# Patient Record
Sex: FEMALE | Employment: FULL TIME | URBAN - METROPOLITAN AREA
[De-identification: names, ages, dates, MRNs, and addresses within clinical notes are randomized per-mention and may not be internally consistent; named-entity substitution may affect disease eponyms.]

---

## 2021-01-04 ENCOUNTER — OFFICE VISIT (OUTPATIENT)
Dept: HEMATOLOGY | Age: 36
End: 2021-01-04

## 2021-01-04 ENCOUNTER — HOSPITAL ENCOUNTER (OUTPATIENT)
Dept: LAB | Age: 36
Discharge: HOME | End: 2021-01-04
Payer: OTHER GOVERNMENT

## 2021-01-04 VITALS
OXYGEN SATURATION: 98 % | DIASTOLIC BLOOD PRESSURE: 107 MMHG | WEIGHT: 229 LBS | RESPIRATION RATE: 16 BRPM | BODY MASS INDEX: 36.8 KG/M2 | HEIGHT: 66 IN | SYSTOLIC BLOOD PRESSURE: 150 MMHG | HEART RATE: 97 BPM | TEMPERATURE: 100 F

## 2021-01-04 DIAGNOSIS — R74.8 ELEVATED LIVER ENZYMES: ICD-10-CM

## 2021-01-04 DIAGNOSIS — R74.8 ELEVATED LIVER ENZYMES: Primary | ICD-10-CM

## 2021-01-04 PROBLEM — E06.3 HASHIMOTO'S DISEASE: Status: ACTIVE | Noted: 2021-01-04

## 2021-01-04 LAB
ALBUMIN SERPL-MCNC: 3.5 G/DL (ref 3.4–5)
ALBUMIN/GLOB SERPL: 1 {RATIO} (ref 0.8–1.7)
ALP SERPL-CCNC: 84 U/L (ref 45–117)
ALT SERPL-CCNC: 22 U/L (ref 13–56)
ANION GAP SERPL CALC-SCNC: 6 MMOL/L (ref 3–18)
AST SERPL-CCNC: 11 U/L (ref 10–38)
BASOPHILS # BLD: 0 K/UL (ref 0–0.1)
BASOPHILS NFR BLD: 0 % (ref 0–2)
BILIRUB DIRECT SERPL-MCNC: <0.1 MG/DL (ref 0–0.2)
BILIRUB SERPL-MCNC: 0.2 MG/DL (ref 0.2–1)
BUN SERPL-MCNC: 10 MG/DL (ref 7–18)
BUN/CREAT SERPL: 15 (ref 12–20)
CALCIUM SERPL-MCNC: 9 MG/DL (ref 8.5–10.1)
CHLORIDE SERPL-SCNC: 107 MMOL/L (ref 100–111)
CO2 SERPL-SCNC: 25 MMOL/L (ref 21–32)
CREAT SERPL-MCNC: 0.68 MG/DL (ref 0.6–1.3)
DIFFERENTIAL METHOD BLD: ABNORMAL
EOSINOPHIL # BLD: 0.1 K/UL (ref 0–0.4)
EOSINOPHIL NFR BLD: 1 % (ref 0–5)
ERYTHROCYTE [DISTWIDTH] IN BLOOD BY AUTOMATED COUNT: 13.1 % (ref 11.6–14.5)
FERRITIN SERPL-MCNC: 42 NG/ML (ref 8–388)
GLOBULIN SER CALC-MCNC: 3.4 G/DL (ref 2–4)
GLUCOSE SERPL-MCNC: 121 MG/DL (ref 74–99)
HCT VFR BLD AUTO: 39.9 % (ref 35–45)
HGB BLD-MCNC: 13.2 G/DL (ref 12–16)
INR PPP: 1 (ref 0.8–1.2)
IRON SATN MFR SERPL: 22 % (ref 20–50)
IRON SERPL-MCNC: 91 UG/DL (ref 50–175)
LYMPHOCYTES # BLD: 1.9 K/UL (ref 0.9–3.6)
LYMPHOCYTES NFR BLD: 25 % (ref 21–52)
MCH RBC QN AUTO: 31.8 PG (ref 24–34)
MCHC RBC AUTO-ENTMCNC: 33.1 G/DL (ref 31–37)
MCV RBC AUTO: 96.1 FL (ref 74–97)
MONOCYTES # BLD: 0.3 K/UL (ref 0.05–1.2)
MONOCYTES NFR BLD: 4 % (ref 3–10)
NEUTS SEG # BLD: 5.3 K/UL (ref 1.8–8)
NEUTS SEG NFR BLD: 70 % (ref 40–73)
PLATELET # BLD AUTO: 370 K/UL (ref 135–420)
PMV BLD AUTO: 9.3 FL (ref 9.2–11.8)
POTASSIUM SERPL-SCNC: 4.3 MMOL/L (ref 3.5–5.5)
PROT SERPL-MCNC: 6.9 G/DL (ref 6.4–8.2)
PROTHROMBIN TIME: 13.1 SEC (ref 11.5–15.2)
RBC # BLD AUTO: 4.15 M/UL (ref 4.2–5.3)
SODIUM SERPL-SCNC: 138 MMOL/L (ref 136–145)
TIBC SERPL-MCNC: 421 UG/DL (ref 250–450)
WBC # BLD AUTO: 7.7 K/UL (ref 4.6–13.2)

## 2021-01-04 PROCEDURE — 86256 FLUORESCENT ANTIBODY TITER: CPT

## 2021-01-04 PROCEDURE — 80076 HEPATIC FUNCTION PANEL: CPT

## 2021-01-04 PROCEDURE — 86708 HEPATITIS A ANTIBODY: CPT

## 2021-01-04 PROCEDURE — 83516 IMMUNOASSAY NONANTIBODY: CPT

## 2021-01-04 PROCEDURE — 83540 ASSAY OF IRON: CPT

## 2021-01-04 PROCEDURE — 99204 OFFICE O/P NEW MOD 45 MIN: CPT | Performed by: NURSE PRACTITIONER

## 2021-01-04 PROCEDURE — 82107 ALPHA-FETOPROTEIN L3: CPT

## 2021-01-04 PROCEDURE — 86038 ANTINUCLEAR ANTIBODIES: CPT

## 2021-01-04 PROCEDURE — 82728 ASSAY OF FERRITIN: CPT

## 2021-01-04 PROCEDURE — 85610 PROTHROMBIN TIME: CPT

## 2021-01-04 PROCEDURE — 86706 HEP B SURFACE ANTIBODY: CPT

## 2021-01-04 PROCEDURE — 86803 HEPATITIS C AB TEST: CPT

## 2021-01-04 PROCEDURE — 86704 HEP B CORE ANTIBODY TOTAL: CPT

## 2021-01-04 PROCEDURE — 36415 COLL VENOUS BLD VENIPUNCTURE: CPT

## 2021-01-04 PROCEDURE — 85025 COMPLETE CBC W/AUTO DIFF WBC: CPT

## 2021-01-04 PROCEDURE — 80048 BASIC METABOLIC PNL TOTAL CA: CPT

## 2021-01-04 RX ORDER — BUPROPION HYDROCHLORIDE 150 MG/1
TABLET, EXTENDED RELEASE ORAL 2 TIMES DAILY
COMMUNITY

## 2021-01-04 RX ORDER — LEVOTHYROXINE SODIUM 88 UG/1
TABLET ORAL
COMMUNITY

## 2021-01-04 RX ORDER — ESCITALOPRAM OXALATE 10 MG/1
10 TABLET ORAL DAILY
COMMUNITY

## 2021-01-04 RX ORDER — NORETHINDRONE AND ETHINYL ESTRADIOL 7 DAYS X 3
1 KIT ORAL DAILY
COMMUNITY

## 2021-01-04 NOTE — PROGRESS NOTES
[address] [facility], Eris DAVIDSON, Elise Wang MD, MPH      Robert Frances, WENDY Angel, [facility]     Marisol Epps, [facility]   Bruce Escobar P-PIA Lauren, [facility]       Zhang[facility]von [facility] 136    at [address], [address] [place] [address] 22.    [phone]    FAX: [address] [place] [address], [address]    [phone]    FAX: [phone]           Patient Care Team:  Bassam Hernandez DO as PCP - General (Family Medicine)  Konrad Jackson MD (Endocrinology)      Problem List  Never Reviewed          Codes Class Noted    Hashimoto's disease ICD-10-CM: E06.3  ICD-9-CM: 245.2  1/4/2021              The clinicians listed above have asked me to see Ophelia Valles in consultation regarding elevated liver enzymes and its management. No medical records were available for review when the patient was here for the appointment. The patient is a 30 y.o.  female who was found to have elevated liver enzymes in 10/2020. Serologic evaluation for markers of chronic liver disease has either not been performed or the results     Ultrasound of the liver was performed in 10/2020. The results of the imaging are not available at this time. The patient believes this suggested fatty liver disease. An assessment of liver fibrosis with biopsy or elastography has not been performed. The patient had not started any new medications within 3 months preceding the elevation in liver chemistries. The patient has no complaints which can be attributed to liver disease. The patient completes all daily activities without any functional limitations.  The patient has not experienced fatigue, fevers, chills, shortness of breath, chest pain, pain in the right side over the liver, diffuse abdominal pain, nausea, vomiting, constipation, diarrhrea, dry eyes, dry mouth, arthralgias, myalgias, yellowing of the eyes or skin, itching, dark urine, problems concentrating, swelling of the abdomen, swelling of the lower extremities, hematemesis, or hematochezia. ASSESSMENT AND PLAN:  Elevated liver enzymes  Persistent elevation in liver transaminases and alkaline phosphatase of unclear etiology at this time. The most recent laboratory studies indicate that the liver transaminases are normal, alkaline phosphatase is normal, tests of hepatic synthetic and metabolic function are   normal, and the platelet count is normal.      Based upon laboratory studies the patient does not appear to have advanced liver disease or cirrhosis. Serologic testing for causes of chronic liver disease was ordered. The most likely causes for the liver chemistry abnormalities were discussed with the patient and include fatty liver disease. The need to perform an assessment of liver fibrosis was discussed with the patient. The Fibroscan can assess liver fibrosis and determine if a patient has advanced fibrosis or cirrhosis without the need for liver biopsy. This will be performed at the next office visit. If the Fibroscan suggests advanced fibrosis then a liver biopsy should be considered. The Fibroscan can be repeated annually or as often as clinically indicated to assess for fibrosis progression and/or regression. If the liver enzymes remain persistently elevated without explanation over 1-2 years then a liver biopsy should be considered. Will perform laboratory testing to monitor liver function and degree of liver injury. Ultrasound of the liver was reportedly performed in 10/2020. The patient will bring in the radiologist report when she returns in 2 weeks for fibroscan.       The need to perform a liver biopsy to help determine the cause and severity of the liver test abnormalities was discussed. The risks of performing the liver biopsy including pain, puncture of the lung, gallbladder, intestine or kidney and bleeding were discussed. There is no reason to perform liver biopsy at this time. Liver chemistries will be monitored. If the liver enzymes remain persistently elevated over the next 1-2 years a liver biopsy should be performed to ensure there is no ongoing chronic liver disease. Screening for Hepatocellular Carcinoma  HCC screening is not necessary if the patient has no evidence of cirrhosis. AFP was ordered today. Ultrasound will be reviewed. Treatment of other medical problems in patients with chronic liver disease  There are no contraindications for the patient to take most medications that are necessary for treatment of other medical issues. The patient does not comsume alcohol on a daily basis. Normal doses of acetaminophen, as recommended on the label of the bottle, are not hepatotoxic except in the setting of daily alcohol use, even in patients with cirrhosis and can be utilized for pain. Counseling for alcohol in patients with chronic liver disease  The patient was counseled regarding alcohol consumption and the effect of alcohol on chronic liver disease. Vaccinations   The need for vaccination against viral hepatitis A and B will be assessed with serologic and instituted as appropriate. Routine vaccinations against other bacterial and viral agents can be performed as indicated. Annual flu vaccination should be administered if indicated. ALLERGIES  No Known Allergies    MEDICATIONS  Current Outpatient Medications   Medication Sig    norethindrone-ethinyl estradiol (Ortho-Novum 7/7/7, 28,) 0.5/0.75/1 mg- 35 mcg tab Take 1 Tab by mouth daily.  levothyroxine (SYNTHROID) 88 mcg tablet Take  by mouth Daily (before breakfast).     escitalopram oxalate (Lexapro) 10 mg tablet Take 10 mg by mouth daily.  buPROPion SR (Wellbutrin SR) 150 mg SR tablet Take  by mouth two (2) times a day. No current facility-administered medications for this visit. SYSTEM REVIEW NOT RELATED TO LIVER DISEASE OR REVIEWED ABOVE:  Constitution systems: Negative for fever, chills, weight gain, weight loss. Eyes: Negative for visual changes. ENT: Negative for sore throat, painful swallowing. Respiratory: Negative for cough, hemoptysis, SOB. Cardiology: Negative for chest pain, palpitations. GI:  Negative for constipation or diarrhea. : Negative for urinary frequency, dysuria, hematuria, nocturia. Skin: Negative for rash. Hematology: Negative for easy bruising, blood clots. Musculo-skelatal: Negative for back pain, muscle pain, weakness. Neurologic: Negative for headaches, dizziness, vertigo, memory problems not related to HE. Psychology: Negative for anxiety, depression. FAMILY HISTORY:  The father  ha the following following chronic disease(s): HTN,  He is 78 y/o    The mother Has/had the following chronic disease(s): HTN. She is 62 y/o  There is no family history of liver disease. The following family members have immune disorders: Father's mother had thyroiditis. Aunt's with diabetes. SOCIAL HISTORY:  The patient is . The patient has no children. The patient currently smokes 1/2 to 1 pack of tobacco daily. The patient consumes 1 to 2 bottles of wine a week. 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 PHYSICAL EXAMINATION:  Visit Vitals  BP (!) 150/107 (BP 1 Location: Left arm, BP Patient Position: Sitting)   Pulse 97   Temp 100 °F (37.8 °C)   Resp 16   Ht 5' 6\" (1.676 m)   Wt 229 lb (103.9 kg)   SpO2 98%   BMI 36.96 kg/m²     General: No acute distress. Eyes: Sclera anicteric. ENT: No oral lesions. Thyroid normal.  Nodes: No adenopathy.    Skin: No spider angiomata.  No jaundice.  No palmar erythema.  Respiratory: Lungs clear to auscultation.   Cardiovascular: Regular heart rate.  No murmurs.  No JVD.  Abdomen: Soft non-tender.  Liver size normal to percussion/palpation.  Spleen not palpable. No obvious ascites.  Extremities: No edema.  No muscle wasting.  No gross arthritic changes.  Neurologic: Alert and oriented.  Cranial nerves grossly intact.  No asterixis.    LABORATORY STUDIES:  [facility] Latest Ref Rng & Units 1/4/2021   WBC 4.6 - 13.2 K/uL 7.7   ANC 1.8 - 8.0 K/UL 5.3   HGB 12.0 - 16.0 g/dL 13.2    - 420 K/uL 370   INR 0.8 - 1.2   1.0   AST 10 - 38 U/L 11   ALT 13 - 56 U/L 22   Alk Phos 45 - 117 U/L 84   Bili, Total 0.2 - 1.0 MG/DL 0.2   Bili, Direct 0.0 - 0.2 MG/DL <0.1   Albumin 3.4 - 5.0 g/dL 3.5   BUN 7.0 - 18 MG/DL 10   Creat 0.6 - 1.3 MG/DL 0.68   Na 136 - 145 mmol/L 138   K 3.5 - 5.5 mmol/L 4.3   Cl 100 - 111 mmol/L 107   CO2 21 - 32 mmol/L 25   Glucose 74 - 99 mg/dL 121 (H)     SEROLOGIES:  Not available or performed.  Testing was performed today.    LIVER HISTOLOGY:  Not available or performed    ENDOSCOPIC PROCEDURES:  Not available or performed    RADIOLOGY:  Not available or performed    OTHER TESTING:  Not available or performed    FOLLOW-UP:  All of the issues listed above in the Assessment and Plan were discussed with the patient.  All questions were answered.  The patient expressed a clear understanding of the above.    Follow-up [facility] in 2 weeks for Fibroscan and to review all data and determine the treatment plan.      JOSUÉ Holley-C  [facility]  [address] [facility]   [facility] [address], [address]   [place] [address] [phone]

## 2021-01-05 LAB
ACTIN IGG SERPL-ACNC: 9 UNITS (ref 0–19)
AFP L3 MFR SERPL: NORMAL % (ref 0–9.9)
AFP SERPL-MCNC: 3.4 NG/ML (ref 0–8)
ANA HOMOGEN TITR SER: NORMAL {TITER}
ANA TITR SER IF: POSITIVE {TITER}
C-ANCA TITR SER IF: NORMAL TITER
COMMENT, 144067: NORMAL
HAV AB SER QL IA: NEGATIVE
HBV CORE AB SERPL QL IA: NEGATIVE
HBV SURFACE AB SER QL IA: POSITIVE
HBV SURFACE AB SERPL IA-ACNC: 76.01 MIU/ML
HCV AB S/CO SERPL IA: <0.1 S/CO RATIO (ref 0–0.9)
HEP BS AB COMMENT,HBSAC: NORMAL
NOTE:, 016270: NORMAL
P-ANCA ATYPICAL TITR SER IF: NORMAL TITER
P-ANCA TITR SER IF: NORMAL TITER

## 2021-02-02 ENCOUNTER — OFFICE VISIT (OUTPATIENT)
Dept: HEMATOLOGY | Age: 36
End: 2021-02-02
Payer: OTHER GOVERNMENT

## 2021-02-02 VITALS
HEART RATE: 108 BPM | DIASTOLIC BLOOD PRESSURE: 99 MMHG | TEMPERATURE: 96.8 F | RESPIRATION RATE: 16 BRPM | HEIGHT: 66 IN | BODY MASS INDEX: 36.64 KG/M2 | WEIGHT: 228 LBS | OXYGEN SATURATION: 98 % | SYSTOLIC BLOOD PRESSURE: 171 MMHG

## 2021-02-02 DIAGNOSIS — R74.8 ELEVATED LIVER ENZYMES: Primary | ICD-10-CM

## 2021-02-02 PROCEDURE — 91200 LIVER ELASTOGRAPHY: CPT | Performed by: NURSE PRACTITIONER

## 2021-02-02 PROCEDURE — 99214 OFFICE O/P EST MOD 30 MIN: CPT | Performed by: NURSE PRACTITIONER

## 2021-02-03 NOTE — PROGRESS NOTES
Sury Barone MD, [address], [place] Robel Guidry, Dawna Nash MD, MPH      WENDY Queen, [facility]     Marisol Epps, [facility]   Nicanor lKeber, [facility]    Vince Goode, [facility]       [facility]von [facility] 136    at [address], [address] [place] [address], [facility] 22.    [phone]    FAX: [address] [place] [address], [address]    [phone]    FAX: [phone]           Patient Care Team:  Poncho Weinstein DO as PCP - General (Family Medicine)  Dwaine Garcia MD (Endocrinology)      Problem List  Date Reviewed: 2/2/2021          Codes Class Noted    Hashimoto's disease ICD-10-CM: E06.3  ICD-9-CM: 245.2  1/4/2021              Mehdi Pagan returns to the [facility] today for fibroscan assessment of hepatic fibrosis and for education and management of elevated liver enzymes, likely due to fatty liver. The active problem list, all pertinent past medical history, medications, liver histology, endoscopic studies, radiologic findings and laboratory findings related to the liver disorder were reviewed with the patient. The patient is a 30 y.o.  female who was found to have elevated liver enzymes in 10/2020. Serologic evaluation was negative for all causes of chronic liver disease except for a positive ZACH, homogeneous pattern. This is consistent with her history of Hashimoto's disease. Ultrasound of the liver was performed in 10/2020 at [facility]. This is suggestive of fatty liver. An assessment of liver fibrosis with fibroscan was performed in the office during this appointment. Results of the scan indicate normal to mild hepatic fibrosis with fatty liver disease.       The patient had not started any new medications within 3 months preceding the elevation in liver chemistries. The patient has no complaints which can be attributed to liver disease. The patient completes all daily activities without any functional limitations. The patient has not experienced fatigue, fevers, chills, shortness of breath, chest pain, pain in the right side over the liver, diffuse abdominal pain, nausea, vomiting, constipation, diarrhrea, dry eyes, dry mouth, arthralgias, myalgias, yellowing of the eyes or skin, itching, dark urine, problems concentrating, swelling of the abdomen, swelling of the lower extremities, hematemesis, or hematochezia. ASSESSMENT AND PLAN:  Elevated liver enzymes  Persistent elevation in liver transaminases and alkaline phosphatase, most likely due to fatty liver disease. The most recent laboratory studies indicate that the liver transaminases are normal, alkaline phosphatase is normal, tests of hepatic synthetic and metabolic function are   normal, and the platelet count is normal.      Based upon laboratory studies, imaging, and fibroscan analysis, the patient does not  have advanced liver disease or cirrhosis. Serologic evaluation was negative for all causes of chronic liver disease except for a positive ZACH, homogeneous pattern. This is consistent with her history of Hashimoto's disease. The most likely causes for the liver chemistry abnormalities were discussed with the patient and include fatty liver disease. The need to perform an assessment of liver fibrosis was discussed with the patient. The Fibroscan can assess liver fibrosis and determine if a patient has advanced fibrosis or cirrhosis without the need for liver biopsy. This was performed in the office during this appointment. Results of the scan indicate normal to mild hepatic fibrosis with fatty liver disease.      The Fibroscan can be repeated annually or as often as clinically indicated to assess for fibrosis progression and/or regression. If the liver enzymes remain persistently elevated without explanation over 1-2 years then a liver biopsy should be considered. Fatty liver  The diagnosis is based upon imaging, Fiboscan CAP score, and serologic studies that are negative for other causes of chronic liver disease. Only a liver biopsy can confirm if the patient does have fatty liver and differentiate NAFL from GREEN. The treatment is the same; weight reduction. If the liver biopsy demonstrates GREEN the patient could be eligible for enrollment into clinical trials for treatment of GREEN. The need to perform a liver biopsy to help determine the cause and severity of the liver test abnormalities was discussed. The risks of performing the liver biopsy including pain, puncture of the lung, gallbladder, intestine or kidney and bleeding were discussed. There is no reason to perform liver biopsy at this time. Liver chemistries will be monitored. If the liver enzymes remain persistently elevated over the next 1-2 years a liver biopsy should be performed to ensure there is no ongoing chronic liver disease. If the patient looses 20% of current body weight all steatosis will have resolved. Once all steatosis has resolved all inflammation will resolve. Then all fibrosis will gradually resolve and the liver could eventually be normal.    There is currently no FDA approved medical treatment for fatty liver, NALFD or GREEN. The only medical treatments for GREEN are though clinical trials. The patient would be a good candidate for enrollment into a clinical trial if found to have GREEN. Since a liver biopsy was not performed it is possible the patient may not have fatty liver or this may not be contributing to the elevation in liver enzymes.     If liver enzymes do not return to normal with weight reduction then additional evaluation including liver biopsy may be necessary to determine if the elevated liver enzymes is due to another etiology. Counseling for diet and weight loss in patients with confirmed or suspected NAFLD  The patient was counseled regarding diet and exercise to achieve weight loss. The best diet for patients with fatty liver is one very low in carbohydrates and enriched with protein such as an Immanuel's program.      The patient was told not to consume any food products and drinks containing fructose as this enhances hepatic fat synthesis. There is no medication or vitamin supplements that we advocate for GREEN. Using glitazones in patients without diabetes mellitus has been shown to reduce fat content in the liver but has no effect on fibrosis and is associated with weight gain. Vitamin E has also been used but the data is not very good and most experts no longer advocate this. Ultrasound of the liver was performed in 10/2020 at [facility]. This is suggestive of fatty liver. Screening for Hepatocellular Carcinoma  HCC screening was recently performed and there is sign of emerging HCC. Treatment of other medical problems in patients with chronic liver disease  There are no contraindications for the patient to take most medications that are necessary for treatment of other medical issues. The patient does not comsume alcohol on a daily basis. Normal doses of acetaminophen, as recommended on the label of the bottle, are not hepatotoxic except in the setting of daily alcohol use, even in patients with cirrhosis and can be utilized for pain. Counseling for alcohol in patients with chronic liver disease  The patient was counseled regarding alcohol consumption and the effect of alcohol on chronic liver disease. Vaccinations   Vaccination for viral hepatitis A is recommended. The patient does not have serologic evidence of prior exposure or vaccination with immunity. Vaccination for viral hepatitis B is not required.   The patient has serologic evidence of prior exposure or vaccination with immunity. Routine vaccinations against other bacterial and viral agents can be performed as indicated. Annual flu vaccination should be administered if indicated. ALLERGIES  No Known Allergies    MEDICATIONS  Current Outpatient Medications   Medication Sig    norethindrone-ethinyl estradiol (Ortho-Novum 7/7/7, 28,) 0.5/0.75/1 mg- 35 mcg tab Take 1 Tab by mouth daily.  levothyroxine (SYNTHROID) 88 mcg tablet Take  by mouth Daily (before breakfast).  escitalopram oxalate (Lexapro) 10 mg tablet Take 10 mg by mouth daily.  buPROPion SR (Wellbutrin SR) 150 mg SR tablet Take  by mouth two (2) times a day. No current facility-administered medications for this visit. SYSTEM REVIEW NOT RELATED TO LIVER DISEASE OR REVIEWED ABOVE:  Constitution systems: Negative for fever, chills, weight gain, weight loss. Eyes: Negative for visual changes. ENT: Negative for sore throat, painful swallowing. Respiratory: Negative for cough, hemoptysis, SOB. Cardiology: Negative for chest pain, palpitations. GI:  Negative for constipation or diarrhea. : Negative for urinary frequency, dysuria, hematuria, nocturia. Skin: Negative for rash. Hematology: Negative for easy bruising, blood clots. Musculo-skelatal: Negative for back pain, muscle pain, weakness. Neurologic: Negative for headaches, dizziness, vertigo, memory problems not related to HE. Psychology: Negative for anxiety, depression. FAMILY HISTORY:  The father  ha the following following chronic disease(s): HTN,  He is 78 y/o    The mother Has/had the following chronic disease(s): HTN. She is 60 y/o  There is no family history of liver disease. The following family members have immune disorders: Father's mother had thyroiditis. Aunt's with diabetes. SOCIAL HISTORY:  The patient is . The patient has no children. The patient currently smokes 1/2 to 1 pack of tobacco daily. The patient consumes 1 to 2 bottles of wine a week. 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 Eyes: Sclera anicteric. ENT: No oral lesions. Thyroid normal.  Nodes: No adenopathy. Skin: No spider angiomata. No jaundice. No palmar erythema. Respiratory: Lungs clear to auscultation. Cardiovascular: Regular heart rate. No murmurs. No JVD. Abdomen: Soft non-tender. Liver size normal to percussion/palpation. Spleen not palpable. No obvious ascites. Extremities: No edema. No muscle wasting. No gross arthritic changes. Neurologic: Alert and oriented. Cranial nerves grossly intact. No asterixis.     LABORATORY STUDIES:  [facility] [place] [facility] [address] Units 1/4/2021   WBC 4.6 - 13.2 K/uL 7.7   ANC 1.8 - 8.0 K/UL 5.3   HGB 12.0 - 16.0 g/dL 13.2    - 420 K/uL 370   INR 0.8 - 1.2   1.0   AST 10 - 38 U/L 11   ALT 13 - 56 U/L 22   Alk Phos 45 - 117 U/L 84   Bili, Total 0.2 - 1.0 MG/DL 0.2   Bili, Direct 0.0 - 0.2 MG/DL <0.1   Albumin 3.4 - 5.0 g/dL 3.5   BUN 7.0 - 18 MG/DL 10   Creat 0.6 - 1.3 MG/DL 0.68   Na 136 - 145 mmol/L 138   K 3.5 - 5.5 mmol/L 4.3   Cl 100 - 111 mmol/L 107   CO2 21 - 32 mmol/L 25   Glucose 74 - 99 mg/dL 121 (H)     SEROLOGIES:  [facility] Latest Ref Rng & Units 1/4/2021   Hep A Ab, Total Negative   Negative   Hep B Core Ab, Total Negative   Negative   Hep B Surface Ab >10.0 mIU/mL 76.01   Hep B Surface Ab Interp POS   Positive   Hep C Ab 0.0 - 0.9 s/co ratio <0.1   Ferritin 8 - 388 NG/ML 42   Iron % Saturation 20 - 50 % 22   ZACH, IFA  Positive (A)   ZACH Pattern  1:80   C-ANCA Neg:<1:20 titer <1:20   P-ANCA Neg:<1:20 titer <1:20   ANCA Neg:<1:20 titer <1:20   ASMCA 0 - 19 Units 9     LIVER HISTOLOGY:  02/2021. FibroScan performed at [address]. EkPa was 5.9. IQR/med 21%. . The results suggested a fibrosis level of F0. The CAP score suggests hepatic steatosis. ENDOSCOPIC PROCEDURES:  Not available or performed    RADIOLOGY:  10/2020. Abdominal [facility] performed at [facility]. No hepatic masses. Suggestive of fatty liver. OTHER TESTING:  Not available or performed    FOLLOW-UP:  All of the issues listed above in the Assessment and Plan were discussed with the patient. All questions were answered. The patient expressed a clear understanding of the above.     [address] in 6 months with a 12 pound weight loss goal.      DALE Kay  [address], [address] [place] [address] Emerald Brew[address]   [phone]

## 2021-08-26 ENCOUNTER — HOSPITAL ENCOUNTER (OUTPATIENT)
Dept: LAB | Age: 36
Discharge: HOME | End: 2021-08-26
Payer: OTHER GOVERNMENT

## 2021-08-26 ENCOUNTER — OFFICE VISIT (OUTPATIENT)
Dept: HEMATOLOGY | Age: 36
End: 2021-08-26
Payer: OTHER GOVERNMENT

## 2021-08-26 VITALS
OXYGEN SATURATION: 99 % | WEIGHT: 226 LBS | TEMPERATURE: 97.5 F | SYSTOLIC BLOOD PRESSURE: 170 MMHG | RESPIRATION RATE: 25 BRPM | HEIGHT: 66 IN | BODY MASS INDEX: 36.32 KG/M2 | HEART RATE: 100 BPM | DIASTOLIC BLOOD PRESSURE: 117 MMHG

## 2021-08-26 DIAGNOSIS — R74.8 ELEVATED LIVER ENZYMES: ICD-10-CM

## 2021-08-26 DIAGNOSIS — R74.8 ELEVATED LIVER ENZYMES: Primary | ICD-10-CM

## 2021-08-26 PROBLEM — K76.0 FATTY LIVER: Status: ACTIVE | Noted: 2021-08-26

## 2021-08-26 LAB
ALBUMIN SERPL-MCNC: 3.8 G/DL (ref 3.4–5)
ALBUMIN/GLOB SERPL: 1.1 {RATIO} (ref 0.8–1.7)
ALP SERPL-CCNC: 64 U/L (ref 45–117)
ALT SERPL-CCNC: 38 U/L (ref 13–56)
ANION GAP SERPL CALC-SCNC: 6 MMOL/L (ref 3–18)
AST SERPL-CCNC: 23 U/L (ref 10–38)
BASOPHILS # BLD: 0.1 K/UL (ref 0–0.1)
BASOPHILS NFR BLD: 1 % (ref 0–2)
BILIRUB DIRECT SERPL-MCNC: <0.1 MG/DL (ref 0–0.2)
BILIRUB SERPL-MCNC: 0.3 MG/DL (ref 0.2–1)
BUN SERPL-MCNC: 11 MG/DL (ref 7–18)
BUN/CREAT SERPL: 17 (ref 12–20)
CALCIUM SERPL-MCNC: 9.1 MG/DL (ref 8.5–10.1)
CHLORIDE SERPL-SCNC: 105 MMOL/L (ref 100–111)
CO2 SERPL-SCNC: 26 MMOL/L (ref 21–32)
CREAT SERPL-MCNC: 0.65 MG/DL (ref 0.6–1.3)
DIFFERENTIAL METHOD BLD: ABNORMAL
EOSINOPHIL # BLD: 0.2 K/UL (ref 0–0.4)
EOSINOPHIL NFR BLD: 2 % (ref 0–5)
ERYTHROCYTE [DISTWIDTH] IN BLOOD BY AUTOMATED COUNT: 13.2 % (ref 11.6–14.5)
GLOBULIN SER CALC-MCNC: 3.6 G/DL (ref 2–4)
GLUCOSE SERPL-MCNC: 103 MG/DL (ref 74–99)
HCT VFR BLD AUTO: 40.8 % (ref 35–45)
HGB BLD-MCNC: 13.8 G/DL (ref 12–16)
LYMPHOCYTES # BLD: 2.7 K/UL (ref 0.9–3.6)
LYMPHOCYTES NFR BLD: 32 % (ref 21–52)
MCH RBC QN AUTO: 32.1 PG (ref 24–34)
MCHC RBC AUTO-ENTMCNC: 33.8 G/DL (ref 31–37)
MCV RBC AUTO: 94.9 FL (ref 78–100)
MONOCYTES # BLD: 0.6 K/UL (ref 0.05–1.2)
MONOCYTES NFR BLD: 7 % (ref 3–10)
NEUTS SEG # BLD: 4.9 K/UL (ref 1.8–8)
NEUTS SEG NFR BLD: 58 % (ref 40–73)
PLATELET # BLD AUTO: 345 K/UL (ref 135–420)
PMV BLD AUTO: 8.9 FL (ref 9.2–11.8)
POTASSIUM SERPL-SCNC: 4.9 MMOL/L (ref 3.5–5.5)
PROT SERPL-MCNC: 7.4 G/DL (ref 6.4–8.2)
RBC # BLD AUTO: 4.3 M/UL (ref 4.2–5.3)
SODIUM SERPL-SCNC: 137 MMOL/L (ref 136–145)
WBC # BLD AUTO: 8.4 K/UL (ref 4.6–13.2)

## 2021-08-26 PROCEDURE — 85025 COMPLETE CBC W/AUTO DIFF WBC: CPT

## 2021-08-26 PROCEDURE — 36415 COLL VENOUS BLD VENIPUNCTURE: CPT

## 2021-08-26 PROCEDURE — 99214 OFFICE O/P EST MOD 30 MIN: CPT | Performed by: NURSE PRACTITIONER

## 2021-08-26 PROCEDURE — 80076 HEPATIC FUNCTION PANEL: CPT

## 2021-08-26 PROCEDURE — 80048 BASIC METABOLIC PNL TOTAL CA: CPT

## 2021-08-26 NOTE — PROGRESS NOTES
Judit Ritter MD, [address], [place] Robel Guidry, Haylie Mesa MD, MPH      IVAN Richards-C Hermine Spatz, [facility]     Marisol Epps, [facility]   Sunny Culver, [facility]    Ananda Jon, [facility]       Zhang Arminut[address]    at [address], [facility] Irma Trinh  22.    [phone]    FAX: [address] [place] [address], [address]    [phone]    FAX: [phone]           Patient Care Team:  Fariba Pal DO as PCP - General (Family Medicine)  Sylvia Addison MD (Endocrinology)      Problem List  Date Reviewed: 8/26/2021        Codes Class Noted    Fatty liver ICD-10-CM: K76.0  ICD-9-CM: 571.8  8/26/2021        Hashimoto's disease ICD-10-CM: E06.3  ICD-9-CM: 245.2  1/4/2021              Maximino Scanlon returns to the [facility] today for fibroscan assessment of hepatic fibrosis and for education and management of elevated liver enzymes, likely due to fatty liver. The active problem list, all pertinent past medical history, medications, liver histology, endoscopic studies, radiologic findings and laboratory findings related to the liver disorder were reviewed with the patient. The patient is a 30 y.o.  female who was found to have elevated liver enzymes in 10/2020. Serologic evaluation was negative for all causes of chronic liver disease except for a positive ZACH, homogeneous pattern. This is consistent with her history of Hashimoto's disease. Ultrasound of the liver was performed in 10/2020 at [facility]. This is suggestive of fatty liver. An assessment of liver fibrosis with fibroscan was performed in the office in 02/2021.  Results of the scan indicate normal to mild hepatic fibrosis with fatty liver disease. The patient had not started any new medications within 3 months preceding the elevation in liver chemistries. The patient has no complaints which can be attributed to liver disease. The patient completes all daily activities without any functional limitations. The patient has not experienced fatigue, fevers, chills, shortness of breath, chest pain, pain in the right side over the liver, diffuse abdominal pain, nausea, vomiting, constipation, diarrhrea, dry eyes, dry mouth, arthralgias, myalgias, yellowing of the eyes or skin, itching, dark urine, problems concentrating, swelling of the abdomen, swelling of the lower extremities, hematemesis, or hematochezia. ASSESSMENT AND PLAN:  Elevated liver enzymes  Persistent elevation in liver transaminases and alkaline phosphatase, most likely due to fatty liver disease. The most recent laboratory studies indicate that the liver transaminases are normal, alkaline phosphatase is normal, tests of hepatic synthetic and metabolic function are normal, and the platelet count is normal.      Based upon laboratory studies, imaging, and fibroscan analysis, the patient does not  have advanced liver disease or cirrhosis. Serologic evaluation was negative for all causes of chronic liver disease except for a positive ZACH, homogeneous pattern. This is consistent with her history of Hashimoto's disease. The most likely causes for the liver chemistry abnormalities were discussed with the patient and include fatty liver disease. The need to perform an assessment of liver fibrosis was discussed with the patient. The Fibroscan can assess liver fibrosis and determine if a patient has advanced fibrosis or cirrhosis without the need for liver biopsy. This was performed in the office in 02/2021. Results of the scan indicate normal to mild hepatic fibrosis with fatty liver disease.      The Fibroscan can be repeated annually or as often as clinically indicated to assess for fibrosis progression and/or regression. The fibroscan will be performed when she returns for her 6 month follow up appointment. If the liver enzymes remain persistently elevated without explanation over 1-2 years then a liver biopsy should be considered. Fatty liver  The diagnosis is based upon imaging, Fiboscan CAP score, and serologic studies that are negative for other causes of chronic liver disease. Only a liver biopsy can confirm if the patient does have fatty liver and differentiate NAFL from GREEN. The treatment is the same; weight reduction. If the liver biopsy demonstrates GREEN the patient could be eligible for enrollment into clinical trials for treatment of GREEN. The need to perform a liver biopsy to help determine the cause and severity of the liver test abnormalities was discussed. The risks of performing the liver biopsy including pain, puncture of the lung, gallbladder, intestine or kidney and bleeding were discussed. There is no reason to perform liver biopsy at this time. Liver chemistries will be monitored. If the liver enzymes remain persistently elevated over the next 1-2 years a liver biopsy should be performed to ensure there is no ongoing chronic liver disease. If the patient looses 20% of current body weight all steatosis will have resolved. Once all steatosis has resolved all inflammation will resolve. Then all fibrosis will gradually resolve and the liver could eventually be normal.    There is currently no FDA approved medical treatment for fatty liver, NALFD or GREEN. The only medical treatments for GREEN are though clinical trials. The patient would be a good candidate for enrollment into a clinical trial if found to have GREEN. Since a liver biopsy was not performed it is possible the patient may not have fatty liver or this may not be contributing to the elevation in liver enzymes.     If liver enzymes do not return to normal with weight reduction then additional evaluation including liver biopsy may be necessary to determine if the elevated liver enzymes is due to another etiology. Counseling for diet and weight loss in patients with confirmed or suspected NAFLD  The patient was counseled regarding diet and exercise to achieve weight loss. The best diet for patients with fatty liver is one very low in carbohydrates and enriched with protein such as an Immanuel's program.      The patient was told not to consume any food products and drinks containing fructose as this enhances hepatic fat synthesis. There is no medication or vitamin supplements that we advocate for GREEN. Using glitazones in patients without diabetes mellitus has been shown to reduce fat content in the liver but has no effect on fibrosis and is associated with weight gain. Vitamin E has also been used but the data is not very good and most experts no longer advocate this. Ultrasound of the liver was performed in 10/2020 at [facility]. This is suggestive of fatty liver. Screening for Hepatocellular Carcinoma  HCC screening was recently performed and there is sign of emerging HCC. Treatment of other medical problems in patients with chronic liver disease  There are no contraindications for the patient to take most medications that are necessary for treatment of other medical issues. The patient does not comsume alcohol on a daily basis. Normal doses of acetaminophen, as recommended on the label of the bottle, are not hepatotoxic except in the setting of daily alcohol use, even in patients with cirrhosis and can be utilized for pain. Counseling for alcohol in patients with chronic liver disease  The patient was counseled regarding alcohol consumption and the effect of alcohol on chronic liver disease. Vaccinations   Vaccination for viral hepatitis A is recommended.   The patient does not have serologic evidence of prior exposure or vaccination with immunity. Vaccination for viral hepatitis B is not required. The patient has serologic evidence of prior exposure or vaccination with immunity. Routine vaccinations against other bacterial and viral agents can be performed as indicated. Annual flu vaccination should be administered if indicated. ALLERGIES  No Known Allergies    MEDICATIONS  Current Outpatient Medications   Medication Sig    norethindrone-ethinyl estradiol (Ortho-Novum 7/7/7, 28,) 0.5/0.75/1 mg- 35 mcg tab Take 1 Tab by mouth daily.  levothyroxine (SYNTHROID) 88 mcg tablet Take  by mouth Daily (before breakfast).  escitalopram oxalate (Lexapro) 10 mg tablet Take 10 mg by mouth daily.  buPROPion SR (Wellbutrin SR) 150 mg SR tablet Take  by mouth two (2) times a day. No current facility-administered medications for this visit. SYSTEM REVIEW NOT RELATED TO LIVER DISEASE OR REVIEWED ABOVE:  Constitution systems: Negative for fever, chills, weight gain, weight loss. Eyes: Negative for visual changes. ENT: Negative for sore throat, painful swallowing. Respiratory: Negative for cough, hemoptysis, SOB. Cardiology: Negative for chest pain, palpitations. GI:  Negative for constipation or diarrhea. : Negative for urinary frequency, dysuria, hematuria, nocturia. Skin: Negative for rash. Hematology: Negative for easy bruising, blood clots. Musculo-skelatal: Negative for back pain, muscle pain, weakness. Neurologic: Negative for headaches, dizziness, vertigo, memory problems not related to HE. Psychology: Negative for anxiety, depression. FAMILY HISTORY:  The father  ha the following following chronic disease(s): HTN,  He is 80 y/o    The mother Has/had the following chronic disease(s): HTN. She is 62 y/o  There is no family history of liver disease. The following family members have immune disorders: Father's mother had thyroiditis. Aunt's with diabetes.      SOCIAL HISTORY:  The patient is . The patient has no children. The patient previously smoked 1/2 to 1 pack of tobacco daily. She quit smoking at [facility] end of May\". The patient consumes 1 to 2 bottles of wine a week. 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 PHYSICAL EXAMINATION:  Visit Vitals  BP (!) 170/117   Pulse 100   Temp 97.5 °F (36.4 °C)   Resp 25   Ht 5' 6\" (1.676 m)   Wt 226 lb (102.5 kg)   SpO2 99%   BMI 36.48 kg/m²     General: No acute distress. Eyes: Sclera anicteric. ENT: No oral lesions. Thyroid normal.  Nodes: No adenopathy. Skin: No spider angiomata. No jaundice. No palmar erythema. Respiratory: Lungs clear to auscultation. Cardiovascular: Regular heart rate. No murmurs. No JVD. Abdomen: Soft non-tender. Liver size normal to percussion/palpation. Spleen not palpable. No obvious ascites. Extremities: No edema. No muscle wasting. No gross arthritic changes. Neurologic: Alert and oriented. Cranial nerves grossly intact. No asterixis.     LABORATORY STUDIES:  Liver [place] of [address] Units 1/4/2021   WBC 4.6 - 13.2 K/uL 7.7   ANC 1.8 - 8.0 K/UL 5.3   HGB 12.0 - 16.0 g/dL 13.2    - 420 K/uL 370   INR 0.8 - 1.2   1.0   AST 10 - 38 U/L 11   ALT 13 - 56 U/L 22   Alk Phos 45 - 117 U/L 84   Bili, Total 0.2 - 1.0 MG/DL 0.2   Bili, Direct 0.0 - 0.2 MG/DL <0.1   Albumin 3.4 - 5.0 g/dL 3.5   BUN 7.0 - 18 MG/DL 10   Creat 0.6 - 1.3 MG/DL 0.68   Na 136 - 145 mmol/L 138   K 3.5 - 5.5 mmol/L 4.3   Cl 100 - 111 mmol/L 107   CO2 21 - 32 mmol/L 25   Glucose 74 - 99 mg/dL 121 (H)     SEROLOGIES:  Serologies Latest Ref Rng & Units 1/4/2021   Hep A Ab, Total Negative   Negative   Hep B Core Ab, Total Negative   Negative   Hep B Surface Ab >10.0 mIU/mL 76.01   Hep B Surface Ab Interp POS   Positive   Hep C Ab 0.0 - 0.9 s/co ratio <0.1   Ferritin 8 - 388 NG/ML 42   Iron % Saturation 20 - 50 % 22 ZACH, IFA  Positive (A)   ZACH Pattern  1:80   C-ANCA Neg:<1:20 titer <1:20   P-ANCA Neg:<1:20 titer <1:20   ANCA Neg:<1:20 titer <1:20   ASMCA 0 - 19 Units 9     LIVER HISTOLOGY:  02/2021. [facility]. EkPa was 5.9. IQR/med 21%. . The results suggested a fibrosis level of F0. The CAP score suggests hepatic steatosis. ENDOSCOPIC PROCEDURES:  Not available or performed    RADIOLOGY:  09/2020. Abdominal ultrasound performed at [facility]. No hepatic masses. Suggestive of fatty liver. OTHER TESTING:  Not available or performed    FOLLOW-UP:  All of the issues listed above in the Assessment and Plan were discussed with the patient. All questions were answered. The patient expressed a clear understanding of the above. [address] in 6 months with a 12 pound weight loss goal.  Will repeat the fibroscan at that appointment.       DALE Watters  Liver [place] of [address], [address] [place] [address] Emerald Brewster, [address] [place] [address]   [phone]

## 2022-03-19 PROBLEM — K76.0 FATTY LIVER: Status: ACTIVE | Noted: 2021-08-26

## 2022-03-20 PROBLEM — E06.3 HASHIMOTO'S DISEASE: Status: ACTIVE | Noted: 2021-01-04

## 2022-07-13 ENCOUNTER — OFFICE VISIT (OUTPATIENT)
Dept: HEMATOLOGY | Age: 37
End: 2022-07-13
Payer: OTHER GOVERNMENT

## 2022-07-13 ENCOUNTER — HOSPITAL ENCOUNTER (OUTPATIENT)
Dept: LAB | Age: 37
Discharge: HOME | End: 2022-07-13
Payer: OTHER GOVERNMENT

## 2022-07-13 VITALS
HEART RATE: 100 BPM | HEIGHT: 66 IN | TEMPERATURE: 98 F | WEIGHT: 228 LBS | SYSTOLIC BLOOD PRESSURE: 131 MMHG | BODY MASS INDEX: 36.64 KG/M2 | DIASTOLIC BLOOD PRESSURE: 95 MMHG | OXYGEN SATURATION: 98 %

## 2022-07-13 DIAGNOSIS — R74.8 ELEVATED LIVER ENZYMES: ICD-10-CM

## 2022-07-13 DIAGNOSIS — R74.8 ELEVATED LIVER ENZYMES: Primary | ICD-10-CM

## 2022-07-13 PROBLEM — I10 HTN (HYPERTENSION): Status: ACTIVE | Noted: 2022-07-13

## 2022-07-13 PROBLEM — F41.9 ANXIETY: Status: ACTIVE | Noted: 2022-07-13

## 2022-07-13 LAB
ALBUMIN SERPL-MCNC: 3.9 G/DL (ref 3.4–5)
ALBUMIN/GLOB SERPL: 1.1 {RATIO} (ref 0.8–1.7)
ALP SERPL-CCNC: 77 U/L (ref 45–117)
ALT SERPL-CCNC: 100 U/L (ref 13–56)
ANION GAP SERPL CALC-SCNC: 6 MMOL/L (ref 3–18)
AST SERPL-CCNC: 95 U/L (ref 10–38)
BASOPHILS # BLD: 0.1 K/UL (ref 0–0.1)
BASOPHILS NFR BLD: 1 % (ref 0–2)
BILIRUB DIRECT SERPL-MCNC: 0.1 MG/DL (ref 0–0.2)
BILIRUB SERPL-MCNC: 0.3 MG/DL (ref 0.2–1)
BUN SERPL-MCNC: 13 MG/DL (ref 7–18)
BUN/CREAT SERPL: 14 (ref 12–20)
CALCIUM SERPL-MCNC: 9.8 MG/DL (ref 8.5–10.1)
CHLORIDE SERPL-SCNC: 106 MMOL/L (ref 100–111)
CO2 SERPL-SCNC: 25 MMOL/L (ref 21–32)
CREAT SERPL-MCNC: 0.96 MG/DL (ref 0.6–1.3)
DIFFERENTIAL METHOD BLD: ABNORMAL
EOSINOPHIL # BLD: 0.2 K/UL (ref 0–0.4)
EOSINOPHIL NFR BLD: 2 % (ref 0–5)
ERYTHROCYTE [DISTWIDTH] IN BLOOD BY AUTOMATED COUNT: 13.2 % (ref 11.6–14.5)
GLOBULIN SER CALC-MCNC: 3.6 G/DL (ref 2–4)
GLUCOSE SERPL-MCNC: 97 MG/DL (ref 74–99)
HCT VFR BLD AUTO: 40 % (ref 35–45)
HGB BLD-MCNC: 13.6 G/DL (ref 12–16)
IMM GRANULOCYTES # BLD AUTO: 0 K/UL (ref 0–0.04)
IMM GRANULOCYTES NFR BLD AUTO: 1 % (ref 0–0.5)
LYMPHOCYTES # BLD: 2.4 K/UL (ref 0.9–3.6)
LYMPHOCYTES NFR BLD: 30 % (ref 21–52)
MCH RBC QN AUTO: 32.6 PG (ref 24–34)
MCHC RBC AUTO-ENTMCNC: 34 G/DL (ref 31–37)
MCV RBC AUTO: 95.9 FL (ref 78–100)
MONOCYTES # BLD: 0.6 K/UL (ref 0.05–1.2)
MONOCYTES NFR BLD: 7 % (ref 3–10)
NEUTS SEG # BLD: 4.8 K/UL (ref 1.8–8)
NEUTS SEG NFR BLD: 59 % (ref 40–73)
NRBC # BLD: 0 K/UL (ref 0–0.01)
NRBC BLD-RTO: 0 PER 100 WBC
PLATELET # BLD AUTO: 348 K/UL (ref 135–420)
PMV BLD AUTO: 9.1 FL (ref 9.2–11.8)
POTASSIUM SERPL-SCNC: 4.3 MMOL/L (ref 3.5–5.5)
PROT SERPL-MCNC: 7.5 G/DL (ref 6.4–8.2)
RBC # BLD AUTO: 4.17 M/UL (ref 4.2–5.3)
SODIUM SERPL-SCNC: 137 MMOL/L (ref 136–145)
WBC # BLD AUTO: 8.1 K/UL (ref 4.6–13.2)

## 2022-07-13 PROCEDURE — 36415 COLL VENOUS BLD VENIPUNCTURE: CPT

## 2022-07-13 PROCEDURE — 80048 BASIC METABOLIC PNL TOTAL CA: CPT

## 2022-07-13 PROCEDURE — 85025 COMPLETE CBC W/AUTO DIFF WBC: CPT

## 2022-07-13 PROCEDURE — 80076 HEPATIC FUNCTION PANEL: CPT

## 2022-07-13 PROCEDURE — 99214 OFFICE O/P EST MOD 30 MIN: CPT | Performed by: NURSE PRACTITIONER

## 2022-07-13 PROCEDURE — 91200 LIVER ELASTOGRAPHY: CPT | Performed by: NURSE PRACTITIONER

## 2022-07-13 RX ORDER — AMLODIPINE BESYLATE 5 MG/1
TABLET ORAL
COMMUNITY
Start: 2022-07-08

## 2022-07-13 RX ORDER — TELMISARTAN 20 MG/1
TABLET ORAL
COMMUNITY
Start: 2022-06-01

## 2022-07-13 NOTE — PROGRESS NOTES
[address] \[facility] [place] [facility]\"", Eris DAVIDSON, Barb Robel [place], [place]      WENDY Obrien, [facility]     Marisol Epps, [facility]   JOSUÉ Diop-PIA Henry, [facility]    at [address], [address] [place] [address], Irma  22.    [phone]    FAX: [address] [place] [address]    [address], [address] [facility], [address]    [phone]    FAX: [phone]       Patient Care Team:  Chen Cao DO as PCP - General (Family Medicine)  Patrick Garg MD (Endocrinology Physician)      Problem List  Date Reviewed: 7/13/2022          Codes Class Noted    HTN (hypertension) ICD-10-CM: I10  ICD-9-CM: 401.9  7/13/2022        Anxiety ICD-10-CM: F41.9  ICD-9-CM: 300.00  7/13/2022        Fatty liver ICD-10-CM: K76.0  ICD-9-CM: 571.8  8/26/2021        Hashimoto's disease ICD-10-CM: E06.3  ICD-9-CM: 245.2  1/4/2021              Adan Fernandez returns to the [facility] today for fibroscan assessment of hepatic fibrosis and for education and management of elevated liver enzymes, likely due to fatty liver. The active problem list, all pertinent past medical history, medications, liver histology, endoscopic studies, radiologic findings and laboratory findings related to the liver disorder were reviewed with the patient. The patient is a 29 y.o.  female who was found to have elevated liver enzymes in 10/2020. Serologic evaluation was negative for all causes of chronic liver disease except for a positive ZACH, homogeneous pattern. This is consistent with her history of Hashimoto's disease. Ultrasound of the liver was performed in 10/2020 at [facility]. This is suggestive of fatty liver.         An assessment of liver fibrosis with fibroscan was performed in the office in 02/2021. Results of the scan indicate normal to mild hepatic fibrosis with fatty liver disease. When the scan was repeated today, the results indicate that she now has some very mild hepatic fibrosis with a suggested Metavir fibrosis ccore of F1. The scan also demonstrates a fatty liver. The patient had not started any new medications within 3 months preceding the elevation in liver chemistries. The patient has no complaints which can be attributed to liver disease. The patient completes all daily activities without any functional limitations. The patient has not experienced fatigue, fevers, chills, shortness of breath, chest pain, pain in the right side over the liver, diffuse abdominal pain, nausea, vomiting, constipation, diarrhrea, dry eyes, dry mouth, arthralgias, myalgias, yellowing of the eyes or skin, itching, dark urine, problems concentrating, swelling of the abdomen, swelling of the lower extremities, hematemesis, or hematochezia. ASSESSMENT AND PLAN:  Elevated liver enzymes  Persistent elevation in liver transaminases and alkaline phosphatase, most likely due to fatty liver disease. The most recent laboratory studies indicate that the liver transaminases are normal, alkaline phosphatase is normal, tests of hepatic synthetic and metabolic function are normal, and the platelet count is normal.      Based upon laboratory studies, imaging, and fibroscan analysis, the patient does not have advanced liver disease or cirrhosis. Serologic evaluation was negative for all causes of chronic liver disease except for a positive ZACH, homogeneous pattern. This is consistent with her history of Hashimoto's disease. The most likely causes for the liver chemistry abnormalities were discussed with the patient and include fatty liver disease. The need to perform an assessment of liver fibrosis was discussed with the patient.   The Fibroscan can assess liver fibrosis and determine if a patient has advanced fibrosis or cirrhosis without the need for liver biopsy. This was performed in the office in 02/2021 and again during today's appointment. Results of the scan from 02/2021 indicate normal to mild hepatic fibrosis with fatty liver disease. Today's scan indicates normal to mild liver stiffness with a suggested Metavir fibrosisscore of F1. Today's scan also indicates fatty liver. The Fibroscan can be repeated annually or as often as clinically indicated to assess for fibrosis progression and/or regression. The fibroscan will be performed when she returns in one year for follow up appointment. If the liver enzymes remain persistently elevated without explanation over 1-2 years then a liver biopsy should be considered. Fatty liver  The diagnosis is based upon imaging, Fiboscan CAP score, and serologic studies that are negative for other causes of chronic liver disease. Only a liver biopsy can confirm if the patient does have fatty liver and differentiate NAFL from GREEN. The treatment is the same; weight reduction. If the liver biopsy demonstrates GREEN the patient could be eligible for enrollment into clinical trials for treatment of GREEN. The need to perform a liver biopsy to help determine the cause and severity of the liver test abnormalities was discussed. The risks of performing the liver biopsy including pain, puncture of the lung, gallbladder, intestine or kidney and bleeding were discussed. There is no reason to perform liver biopsy at this time. Liver chemistries will be monitored. If the liver enzymes remain persistently elevated over the next 1-2 years a liver biopsy should be performed to ensure there is no ongoing chronic liver disease. If the patient looses 20% of current body weight all steatosis will have resolved. Once all steatosis has resolved all inflammation will resolve.   Then all fibrosis will gradually resolve and the liver could eventually be normal.    There is currently no FDA approved medical treatment for fatty liver, NALFD or GREEN. The only medical treatments for GREEN are though clinical trials. The patient would be a good candidate for enrollment into a clinical trial if found to have GREEN. Since a liver biopsy was not performed it is possible the patient may not have fatty liver or this may not be contributing to the elevation in liver enzymes. If liver enzymes do not return to normal with weight reduction then additional evaluation including liver biopsy may be necessary to determine if the elevated liver enzymes is due to another etiology. Counseling for diet and weight loss in patients with confirmed or suspected NAFLD  The patient was counseled regarding diet and exercise to achieve weight loss. The best diet for patients with fatty liver is one very low in carbohydrates and enriched with protein such as an Immanuel's program.      The patient was told not to consume any food products and drinks containing fructose as this enhances hepatic fat synthesis. There is no medication or vitamin supplements that we advocate for GREEN. Using glitazones in patients without diabetes mellitus has been shown to reduce fat content in the liver but has no effect on fibrosis and is associated with weight gain. Vitamin E has also been used but the data is not very good and most experts no longer advocate this. Ultrasound of the liver was performed in 10/2020 at [facility]. This is suggestive of fatty liver. Screening for Hepatocellular Carcinoma  HCC screening was recently performed and there is sign of emerging HCC. Treatment of other medical problems in patients with chronic liver disease  There are no contraindications for the patient to take most medications that are necessary for treatment of other medical issues.     The patient does not comsume alcohol on a daily basis.    Normal doses of acetaminophen, as recommended on the label of the bottle, are not hepatotoxic except in the setting of daily alcohol use, even in patients with cirrhosis and can be utilized for pain. Counseling for alcohol in patients with chronic liver disease  The patient was counseled regarding alcohol consumption and the effect of alcohol on chronic liver disease. Vaccinations   Vaccination for viral hepatitis A is recommended. The patient does not have serologic evidence of prior exposure or vaccination with immunity. Vaccination for viral hepatitis B is not required. The patient has serologic evidence of prior exposure or vaccination with immunity. Routine vaccinations against other bacterial and viral agents can be performed as indicated. Annual flu vaccination should be administered if indicated. ALLERGIES  No Known Allergies    MEDICATIONS  Current Outpatient Medications   Medication Sig    amLODIPine (NORVASC) 5 mg tablet     telmisartan (MICARDIS) 20 mg tablet     norethindrone-ethinyl estradiol (Ortho-Novum 7/7/7, 28,) 0.5/0.75/1 mg- 35 mcg tab Take 1 Tab by mouth daily.  levothyroxine (SYNTHROID) 88 mcg tablet Take  by mouth Daily (before breakfast).  escitalopram oxalate (Lexapro) 10 mg tablet Take 10 mg by mouth daily.  buPROPion SR (Wellbutrin SR) 150 mg SR tablet Take  by mouth two (2) times a day. No current facility-administered medications for this visit. SYSTEM REVIEW NOT RELATED TO LIVER DISEASE OR REVIEWED ABOVE:  Constitution systems: Negative for fever, chills, weight gain, weight loss. Eyes: Negative for visual changes. ENT: Negative for sore throat, painful swallowing. Respiratory: Negative for cough, hemoptysis, SOB. Cardiology: Negative for chest pain, palpitations. GI:  Negative for constipation or diarrhea. : Negative for urinary frequency, dysuria, hematuria, nocturia. Skin: Negative for rash.   Hematology: Negative for easy bruising, blood clots. Musculo-skelatal: Negative for back pain, muscle pain, weakness. Neurologic: Negative for headaches, dizziness, vertigo, memory problems not related to HE. Psychology: Negative for anxiety, depression. FAMILY HISTORY:  The father has the following following chronic disease(s): HTN,  He is 69 y/o    The mother has the following chronic disease(s): HTN. She is 57 y/o  There is no family history of liver disease. The following family members have immune disorders: Father's mother had thyroiditis. Aunt's with diabetes. SOCIAL HISTORY:  The patient is . The patient has no children. The patient previously smoked 1/2 to 1 pack of tobacco daily. She quit smoking at [facility] end of May\", 2021. .   The patient consumes 1 to 2 bottles of wine a week. 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 PHYSICAL EXAMINATION:  Visit Vitals  BP (!) 131/95   Pulse 100   Temp 98 °F (36.7 °C)   Ht 5' 6\" (1.676 m)   Wt 228 lb (103.4 kg)   SpO2 98%   BMI 36.80 kg/m²     General: No acute distress. Eyes: Sclera anicteric. ENT: No oral lesions. Thyroid normal.  Nodes: No adenopathy. Skin: No spider angiomata. No jaundice. No palmar erythema. Respiratory: Lungs clear to auscultation. Cardiovascular: Regular heart rate. No murmurs. No JVD. Abdomen: Soft non-tender. Liver size normal to percussion/palpation. Spleen not palpable. No obvious ascites. Extremities: No edema. No muscle wasting. No gross arthritic changes. Neurologic: Alert and oriented. Cranial nerves grossly intact. No asterixis.     LABORATORY STUDIES:  Liver [place] of [address] Units 8/26/2021 1/4/2021   WBC 4.6 - 13.2 K/uL 8.4 7.7   ANC 1.8 - 8.0 K/UL 4.9 5.3   HGB 12.0 - 16.0 g/dL 13.8 13.2    - 420 K/uL 345 370   INR 0.8 - 1.2    1.0   AST 10 - 38 U/L 23 11   ALT 13 - 56 U/L 38 22   Alk Phos 45 - 117 U/L 64 84   Bili, Total 0.2 - 1.0 MG/DL 0.3 0.2   Bili, Direct 0.0 - 0.2 MG/DL <0.1 <0.1   Albumin 3.4 - 5.0 g/dL 3.8 3.5   BUN 7.0 - 18 MG/DL 11 10   Creat 0.6 - 1.3 MG/DL 0.65 0.68   Na 136 - 145 mmol/L 137 138   K 3.5 - 5.5 mmol/L 4.9 4.3   Cl 100 - 111 mmol/L 105 107   CO2 21 - 32 mmol/L 26 25   Glucose 74 - 99 mg/dL 103 (H) 121 (H)     SEROLOGIES:  Serologies Latest Ref Rng & Units 1/4/2021   Hep A Ab, Total Negative   Negative   Hep B Core Ab, Total Negative   Negative   Hep B Surface Ab >10.0 mIU/mL 76.01   Hep B Surface Ab Interp POS   Positive   Hep C Ab 0.0 - 0.9 s/co ratio <0.1   Ferritin 8 - 388 NG/ML 42   Iron % Saturation 20 - 50 % 22   ZACH, IFA  Positive (A)   ZACH Pattern  1:80   C-ANCA Neg:<1:20 titer <1:20   P-ANCA Neg:<1:20 titer <1:20   ANCA Neg:<1:20 titer <1:20   ASMCA 0 - 19 Units 9     LIVER HISTOLOGY:  02/2021. FibroScan performed at [address]. EkPa was 5.9. IQR/med 21%. . The results suggested a fibrosis level of F0. The CAP score suggests hepatic steatosis. 07/2022. FibroScan performed at [address]. EkPa was 6.66. IQR/med 13%. . The results suggested a fibrosis level of F1. The CAP score suggests there is hepatic steatosis. ENDOSCOPIC PROCEDURES:  Not available or performed    RADIOLOGY:  09/2020. Abdominal ultrasound performed at [facility]. No hepatic masses. Suggestive of fatty liver. OTHER TESTING:  Not available or performed    FOLLOW-UP:  All of the issues listed above in the Assessment and Plan were discussed with the patient. All questions were answered. The patient expressed a clear understanding of the above. [email or web address] in one year with a 24 pound weight loss goal.  Will repeat the fibroscan at that appointment.       Olene Nickel, FNP-C  Liver [facility]  [address] [facility], [address] [facility]   [address], [address] [facility]   [phone]

## 2022-07-25 DIAGNOSIS — R74.8 ELEVATED LIVER ENZYMES: Primary | ICD-10-CM

## 2022-08-23 ENCOUNTER — HOSPITAL ENCOUNTER (OUTPATIENT)
Dept: LAB | Age: 37
Discharge: HOME | End: 2022-08-23
Payer: OTHER GOVERNMENT

## 2022-08-23 DIAGNOSIS — R74.8 ELEVATED LIVER ENZYMES: ICD-10-CM

## 2022-08-23 LAB
ALBUMIN SERPL-MCNC: 3.7 G/DL (ref 3.4–5)
ALBUMIN/GLOB SERPL: 1 {RATIO} (ref 0.8–1.7)
ALP SERPL-CCNC: 75 U/L (ref 45–117)
ALT SERPL-CCNC: 61 U/L (ref 13–56)
AST SERPL-CCNC: 42 U/L (ref 10–38)
BILIRUB DIRECT SERPL-MCNC: <0.1 MG/DL (ref 0–0.2)
BILIRUB SERPL-MCNC: 0.2 MG/DL (ref 0.2–1)
GLOBULIN SER CALC-MCNC: 3.6 G/DL (ref 2–4)
PROT SERPL-MCNC: 7.3 G/DL (ref 6.4–8.2)

## 2022-08-23 PROCEDURE — 80076 HEPATIC FUNCTION PANEL: CPT

## 2025-04-23 ENCOUNTER — HOSPITAL ENCOUNTER (OUTPATIENT)
Facility: HOSPITAL | Age: 40
Setting detail: RECURRING SERIES
Discharge: HOME | End: 2025-04-26
Payer: COMMERCIAL

## 2025-04-23 PROCEDURE — 97161 PT EVAL LOW COMPLEX 20 MIN: CPT

## 2025-04-23 NOTE — PROGRESS NOTES
PHYSICAL THERAPY Vestibular Evaluation/Daily Note     Patient Name: Maria Esther Long    Date: 2025    : 1985  Insurance: Payor: WENDICONRADO / Plan: DANIEL POS / Product Type: *No Product type* /      Patient  verified yes     Visit #   Current / Total 1 8   Time   In / Out 3:24 pm 4:28 pm   Pain   In / Out 0 0   Subjective Functional Status/Changes: Pt is a 39 year old female presenting with c/o reports she awoke in the middle of the night 2 years ago with extreme dizziness and felt like the room was spinning. That lasted  a few hours and  after that felt unsteady all the time. States she also noticed numbness in her extremities and thought she was having a stroke. Went to the ER and was given fluids and diagnosed with BPPV. Notices she had gotten somewhat better over time but till feels intermittently like she is moving but no longer seeing the room spin. Sometimes balance will be worse and can correlate with changes of seasons. States she most recently had a bout of dizziness in September that referred her. States she again awoke with spinning that lasted a few hours then has felt increased unsteadiness and imbalance that has persisted since though initially gradually got better. States she still has bouts of internal movement. Sometimes notes neck pain and HA in the base of the neck when  episodes occur  monthly. Sometimes feels like she is going to fall forward when getting out of bed. Closes eyes to compensate. Notes today she feels good and does note notice much dizziness at all.   Changes to:  Meds, Allergies, Med Hx, Sx Hx?  If yes, update Summary List no       “If an interpreting service was utilized for treatment of this patient, the contents of this document represent the material reviewed with the patient via the .”    TREATMENT AREA =  Dizziness and giddiness [R42]    SUBJECTIVE  PLOF: functionally independent, no AD, sedentary lifestyle  Limitations to PLOF: intermittent bouts 
of rapid head movement possibly consistent with prior stable vestibulopathy that has largely compensated, but otherwise (-) test findings, (-) indications of mechanical vestibular dysfunction such as BPPV or SCD, (-) neurological screening, and (-) indications of cervicogenic dizziness. She does note symptom pattern that could be indicative of an unstable vestibular dysfunction such as meniere's disease or vestibular migraines as she does note a prior history of migraine headaches that she notes were managed minimally. She will benefit from brief skilled PT follow up for VRT to assess pt response and rule out subtle vestibulopathy. She may benefit from further diagnostics with focus on ruling in or out migraine involvement. Pt will benefit from skilled Pt to address their impairments and facilitate improved functional status.     Evaluation Complexity:  History:  MEDIUM  Complexity : 1-2 comorbidities / personal factors will impact the outcome/ POC ; Examination:  LOW Complexity : 1-2 Standardized tests and measures addressing body structure, function, activity limitation and / or participation in recreation  ;Presentation:  MEDIUM Complexity : Evolving with changing characteristics  ;Clinical Decision Making:  Balance Tests DHI  32;  31-60 Points (moderate handicap): MODERATE Complexity FOTO score = an established functional score where 100 = no disability  Overall Complexity Rating: LOW     Problem List: other intermittent dizziness    Treatment Plan may include any combination of the followin Therapeutic Exercise, 17635 Neuromuscular Re-Education, 17248 Therapeutic Activity, 36712 Self Care/Home Management, 80542 Gait Training, and 01210 Canalith Repositioning    Patient / Family readiness to learn indicated by: asking questions, trying to perform skills, and interest  Persons(s) to be included in education: patient (P)  Barriers to Learning/Limitations: None  Measures taken if barriers to learning 
IV discontinued, cath removed intact

## 2025-05-07 ENCOUNTER — HOSPITAL ENCOUNTER (OUTPATIENT)
Facility: HOSPITAL | Age: 40
Setting detail: RECURRING SERIES
Discharge: HOME | End: 2025-05-10
Payer: COMMERCIAL

## 2025-05-07 PROCEDURE — 97112 NEUROMUSCULAR REEDUCATION: CPT

## 2025-05-07 PROCEDURE — 97110 THERAPEUTIC EXERCISES: CPT

## 2025-05-07 PROCEDURE — 97530 THERAPEUTIC ACTIVITIES: CPT

## 2025-05-07 NOTE — PROGRESS NOTES
PHYSICAL / OCCUPATIONAL THERAPY - DAILY TREATMENT NOTE     Patient Name: Maria Esther Long    Date: 2025    : 1985  Insurance: Payor: DANIEL / Plan: DANILE POS / Product Type: *No Product type* /      Patient  verified Yes     Visit #   Current / Total 2 8   Time   In / Out 2:46pm 3;26pm   Pain   In / Out 0 0   Subjective Functional Status/Changes: Denied migraines since onset of dizziness    Changes to:  Allergies, Med Hx, Sx Hx?   no       TREATMENT AREA =  Dizziness and giddiness [R42]    If an interpreting service is utilized for treatment of this patient, the contents of this document represent the material reviewed with the patient via the .     OBJECTIVE    Therapeutic Procedures:  Tx Min Billable or 1:1 Min (if diff from Tx Min) Procedure, Rationale, Specifics   12  16553 Therapeutic Exercise (timed):  increase ROM, strength, coordination, balance, and proprioception to improve patient's ability to progress to PLOF and address remaining functional goals. (see flow sheet as applicable)    Details if applicable:       18  59822 Neuromuscular Re-Education (timed):  improve balance, coordination, kinesthetic sense, posture, core stability and proprioception to improve patient's ability to develop conscious control of individual muscles and awareness of position of extremities in order to progress to PLOF and address remaining functional goals. (see flow sheet as applicable)    Details if applicable:     10  25024 Therapeutic Activity (timed):  use of dynamic activities replicating functional movements to increase ROM, strength, coordination, balance, and proprioception in order to improve patient's ability to progress to PLOF and address remaining functional goals.  (see flow sheet as applicable)     Details if applicable:           Details if applicable:            Details if applicable:     40  [facility] Totals Reminder: bill using total billable min of TIMED therapeutic procedures

## 2025-05-14 ENCOUNTER — TELEPHONE (OUTPATIENT)
Facility: HOSPITAL | Age: 40
End: 2025-05-14

## 2025-05-14 ENCOUNTER — APPOINTMENT (OUTPATIENT)
Facility: HOSPITAL | Age: 40
End: 2025-05-14
Payer: COMMERCIAL

## 2025-05-16 ENCOUNTER — HOSPITAL ENCOUNTER (OUTPATIENT)
Facility: HOSPITAL | Age: 40
Setting detail: RECURRING SERIES
Discharge: HOME | End: 2025-05-19
Payer: COMMERCIAL

## 2025-05-16 PROCEDURE — 97110 THERAPEUTIC EXERCISES: CPT

## 2025-05-16 PROCEDURE — 97112 NEUROMUSCULAR REEDUCATION: CPT

## 2025-05-16 PROCEDURE — 97530 THERAPEUTIC ACTIVITIES: CPT

## 2025-05-16 NOTE — PROGRESS NOTES
PHYSICAL / OCCUPATIONAL THERAPY - DAILY TREATMENT NOTE     Patient Name: Maria Esther Long    Date: 2025    : 1985  Insurance: Payor: DANIEL / Plan: DANIEL POS / Product Type: *No Product type* /      Patient  verified Yes     Visit #   Current / Total 3 8   Time   In / Out 2;00pm 2:44pm   Pain   In / Out 0 0   Subjective Functional Status/Changes: Pt reports getting lightheaded/dizziness/unsteadiness with heat and quick STS from supine.   Changes to:  Allergies, Med Hx, Sx Hx?   no       TREATMENT AREA =  Dizziness and giddiness [R42]    If an interpreting service is utilized for treatment of this patient, the contents of this document represent the material reviewed with the patient via the .     OBJECTIVE    Therapeutic Procedures:  Tx Min Billable or 1:1 Min (if diff from Tx Min) Procedure, Rationale, Specifics   14  68725 Therapeutic Exercise (timed):  increase ROM, strength, coordination, balance, and proprioception to improve patient's ability to progress to PLOF and address remaining functional goals. (see flow sheet as applicable)    Details if applicable:       20  51647 Neuromuscular Re-Education (timed):  improve balance, coordination, kinesthetic sense, posture, core stability and proprioception to improve patient's ability to develop conscious control of individual muscles and awareness of position of extremities in order to progress to PLOF and address remaining functional goals. (see flow sheet as applicable)    Details if applicable:     10  76702 Therapeutic Activity (timed):  use of dynamic activities replicating functional movements to increase ROM, strength, coordination, balance, and proprioception in order to improve patient's ability to progress to PLOF and address remaining functional goals.  (see flow sheet as applicable)     Details if applicable:           Details if applicable:            Details if applicable:     44  [facility] Totals Reminder: bill using total

## 2025-05-21 ENCOUNTER — HOSPITAL ENCOUNTER (OUTPATIENT)
Facility: HOSPITAL | Age: 40
Setting detail: RECURRING SERIES
Discharge: HOME | End: 2025-05-24
Payer: COMMERCIAL

## 2025-05-21 PROCEDURE — 97112 NEUROMUSCULAR REEDUCATION: CPT

## 2025-05-21 PROCEDURE — 97530 THERAPEUTIC ACTIVITIES: CPT

## 2025-05-21 NOTE — PROGRESS NOTES
PHYSICAL / OCCUPATIONAL THERAPY - DAILY TREATMENT NOTE     Patient Name: Maria Esther Long    Date: 2025    : 1985  Insurance: Payor: DANIEL / Plan: DANIEL POS / Product Type: *No Product type* /      Patient  verified Yes     Visit #   Current / Total 4 8   Time   In / Out 243 328   Pain   In / Out 0 0   Subjective Functional Status/Changes: Reports still getting dizziness with eye ex and imbalance with quick positional changes   Changes to:  Allergies, Med Hx, Sx Hx?   no       TREATMENT AREA =  Dizziness and giddiness [R42]    If an interpreting service is utilized for treatment of this patient, the contents of this document represent the material reviewed with the patient via the .     OBJECTIVE        Therapeutic Procedures:  Tx Min Billable or 1:1 Min (if diff from Tx Min) Procedure, Rationale, Specifics   25  28260 Neuromuscular Re-Education (timed):  improve balance, coordination, kinesthetic sense, posture, core stability and proprioception to improve patient's ability to develop conscious control of individual muscles and awareness of position of extremities in order to progress to PLOF and address remaining functional goals. (see flow sheet as applicable)    Details if applicable:         61973 Therapeutic Activity (timed):  use of dynamic activities replicating functional movements to increase ROM, strength, coordination, balance, and proprioception in order to improve patient's ability to progress to PLOF and address remaining functional goals.  (see flow sheet as applicable)    Details if applicable:                    45  [facility] Totals Reminder: bill using total billable min of TIMED therapeutic procedures (example: do not include dry needle or estim unattended, both untimed codes, in totals to left)  8-22 min = 1 unit; 23-37 min = 2 units; 38-52 min = 3 units; 53-67 min = 4 units; 68-82 min = 5 units   Total Total     TOTAL TREATMENT TIME:        45     Charge

## 2025-05-28 ENCOUNTER — HOSPITAL ENCOUNTER (OUTPATIENT)
Facility: HOSPITAL | Age: 40
Setting detail: RECURRING SERIES
End: 2025-05-28
Payer: COMMERCIAL

## 2025-05-28 NOTE — PROGRESS NOTES
PHYSICAL / OCCUPATIONAL THERAPY - DAILY TREATMENT NOTE     Patient Name: Maria Esther Long    Date: 2025    : 1985  Insurance: Payor: DANIEL / Plan: DANIEL POS / Product Type: *No Product type* /      Patient  verified {YES/NO:68916}     Visit #   Current / Total *** ***   Time   In / Out *** ***   Pain   In / Out *** ***   Subjective Functional Status/Changes: ***   Changes to:  Allergies, Med Hx, Sx Hx?   {YES/NO DIET:895079577}       TREATMENT AREA =  Dizziness and giddiness [R42]    If an interpreting service is utilized for treatment of this patient, the contents of this document represent the material reviewed with the patient via the .     OBJECTIVE    Modalities Rationale:     {InMotion Modality Rationale:52097} to improve patient's ability to progress to PLOF and address remaining functional goals.     min [] Estim Unattended, type/location:                                      []  w/ice    []  w/heat    min [] Estim Attended, type/location:                                     []  w/US     []  w/ice    []  w/heat    []  TENS insruct      min []  Mechanical Traction: type/lbs                   []  pro   []  sup   []  int   []  cont    []  before manual    []  after manual    min []  Ultrasound, settings/location:      min []  Iontophoresis w/ dexamethasone, location:                                               []  take home patch       []  in clinic        min  unbilled []  Ice     []  Heat    location/position:     min []  Paraffin,  details:     min []  Vasopneumatic Device, press/temp:     min []  Whirlpool / Fluido:    If using vaso (only need to measure limb vaso being performed on)      pre-treatment girth :       post-treatment girth :       measured at (landmark location) :      min []  Other:    Skin assessment post-treatment (if applicable):    []  intact    []  redness- no adverse reaction                 []redness - adverse reaction:         Therapeutic

## 2025-07-07 ENCOUNTER — TELEPHONE (OUTPATIENT)
Facility: HOSPITAL | Age: 40
End: 2025-07-07

## 2025-07-07 NOTE — TELEPHONE ENCOUNTER
Left voice mail stating: \"Please call clinic on or before 7/21/2025] to discuss continuation of care.\"